# Patient Record
Sex: FEMALE | Race: WHITE | NOT HISPANIC OR LATINO | Employment: STUDENT | URBAN - METROPOLITAN AREA
[De-identification: names, ages, dates, MRNs, and addresses within clinical notes are randomized per-mention and may not be internally consistent; named-entity substitution may affect disease eponyms.]

---
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
## 2025-02-13 ENCOUNTER — TELEPHONE (OUTPATIENT)
Dept: UROLOGY | Age: 20
End: 2025-02-13

## 2025-02-13 NOTE — TELEPHONE ENCOUNTER
Caller: PTS MOM    Relationship:     Best call back number:     What is the best time to reach you: ANY    Who are you requesting to speak with (clinical staff, provider,  specific staff member): ANY CLINICAL     Do you know the name of the person who called:     What was the call regarding: PTS MOM CALLED STATING PT WAS IN [facility] YESTERDAY, SHE IS UNABLE TO PEE ON HER OWN. SHE WAS DISCHARGED WITH A CATHETER AND TOLD BY [facility] THAT SHE NEEDS TO KEEP IT IN FOR 7 DAYS BEFORE BEING SEEN BY A UROLOGIST. PT HAS NOT SEEN A UROLOGIST IN THE PAST, PTS MOM WANTS CLARIFICATION ON IF THE PT SHOULD BE KEEPING THE CATHETER IN FOR 7 DAYS. PLEASE CALL PTS MOM TO LET HER KNOW.    CREATED REFERRAL AND PLACED IN SCHEDULING REVIEW-- ER NOTES FROM [place] ARE IN CARE EVERYWHERE. THANK YOU.

## 2025-02-17 ENCOUNTER — TELEPHONE (OUTPATIENT)
Dept: OBSTETRICS AND GYNECOLOGY | Facility: CLINIC | Age: 20
End: 2025-02-17
Payer: COMMERCIAL

## 2025-02-17 NOTE — TELEPHONE ENCOUNTER
Provider:  NO WEST    Caller: EYAD PEREZ     Phone Number: [phone]     Reason for Call:formerly [facility] IS REQUESTING MEDICAL RECORDS AND SCANS FROM [phone]//PLEASE FAX [phone]

## 2025-02-18 ENCOUNTER — TELEPHONE (OUTPATIENT)
Dept: OBSTETRICS AND GYNECOLOGY | Facility: CLINIC | Age: 20
End: 2025-02-18
Payer: COMMERCIAL

## 2025-02-18 NOTE — TELEPHONE ENCOUNTER
Tried to fax a couple times and cannot get them to go through. Also tried to call Nia at the number listed in the TE. There was no answer or voicemail. Called patient and left a message regarding this request and that she could come by the office and sign a release to request a paper copy of the requested records.

## 2025-02-18 NOTE — TELEPHONE ENCOUNTER
Hub staff attempted to follow warm transfer process and was unsuccessful     Caller: JAMAAL FROM [facility] IN [facility]      Best call back number:[phone]    Patient is needing: EYAD PEREZ IS REQUESTING A COPY OF ANY INFORMATION PERTAINING TO THE PELVIC EXAMS PERFORMED IN 2023 AND 2024 BE FAXED [phone].  NEEDING THESE ASAP PLEASE.

## 2025-04-28 NOTE — PROGRESS NOTES
"CC:  Establish care, WWE, discuss urinary issues      HPI:   19 y.o. . Presents for well woman exam. Contraception:  None  Menses:   q 4-6 weeks, lasts 4 days, changes super tampon q 4-5 hrs on heaviest days.   Pain:  moderate, management with Excedrin, nsaids, PMS nausea- managed with diet  Last pap: Not of age   Complaints: discuss concerns hx urinary problems, In February experienced urinary hesitancy, treated per [facility] for uti, review of ER note- ua negative for nitrites. Was also evaluate at , ua negative for nitrites, treated for bloating and constipation. Had urinary catheter for 1 week due to inability to void. No more urinary problems since shaw cath removed in February. Had Follow up with urology, symptoms had resolved  and told no further workup warranted.     Past Medical History:   Diagnosis Date    Anxiety     Depression       Past Surgical History:   Procedure Laterality Date    EAR TUBES      TONSILLECTOMY        Family History   Problem Relation Age of Onset    Colon cancer Paternal Grandfather         50s    Breast cancer Paternal Grandmother         70    Heart attack Maternal Grandfather     Alzheimer's disease Maternal Great-Grandfather     Uterine cancer Neg Hx     Ovarian cancer Neg Hx      Allergies as of 2025 - Reviewed 2025   Allergen Reaction Noted    Cefdinir GI Intolerance and Other (See Comments) 2019    Wheat Rash 2022        PCP: does manage PMHx and preventative labs    /75   Pulse 72   Ht 165.1 cm (65\")   Wt 81.2 kg (179 lb)   LMP 2025 (Within Days)   BMI 29.79 kg/m²     PHYSICAL EXAM: Chaperone present   General- NAD, alert and oriented, appropriate  Psych- Normal mood, good memory  Neck- No masses, no thyroid enlargement  Lymphatic- No palpable neck nodes  CV- Regular rhythm, no murmurs  Resp- CTA to bases, no wheezes  Ext- No edema, no cyanosis    Skin- No lesions, no rashes, no acanthosis nigricans    ASSESSMENT and " PLAN:    Diagnoses and all orders for this visit:    1. Well woman exam (Primary)    2. Dysmenorrhea      Preventative:   BREAST HEALTH- Monthly self breast exam importance and how to reviewed. MMG and/or MRI (prn) reviewed per society guidelines and her individual history. Screening Imaging: Not medically needed  CERVICAL CANCER Screening- Reviewed current ASCCP guidelines for screening w and wo cotest HPV, age specific.  Screen: Not medically needed  SEXUAL HEALTH: Declines STD screening,  Safe sex and condoms  VACCINATIONS Recommended: COVID and booster PRN, Flu annually, Gardisil/HPV vaccine (up to 44yo)  Importance discussed, risk being unvaccinated reviewed.  Questions answered  Genetic testing: Does not qualify.  Smoking status- NON SMOKER  Follow up PCP/Specialist PMHx and Labs  TRACK MENSES, RTO if <q21d, >7d long, heavy or painful.    All BIRTH CONTROL options R/B/A/SE/E of each reviewed in detail.  CHC/hormone use risk THROMBOEMBOLIC RISK reviewed.     SAFE SEX/condoms importance reviewed.    She is satisfied with current otc/diet management of PMS and painful menstrual cramps. IF her urinary symptoms return, recommend UA with culture, follow up with urology. IC diet/handouts provided. She declines hormone management of painful menses/PMS symptoms, at this time.     Follow Up:  Return if symptoms worsen or fail to improve.        Jennifer Garcia, APRN  05/23/2025

## 2025-05-23 ENCOUNTER — OFFICE VISIT (OUTPATIENT)
Dept: OBSTETRICS AND GYNECOLOGY | Age: 20
End: 2025-05-23
Payer: COMMERCIAL

## 2025-05-23 VITALS
HEIGHT: 65 IN | SYSTOLIC BLOOD PRESSURE: 105 MMHG | DIASTOLIC BLOOD PRESSURE: 75 MMHG | BODY MASS INDEX: 29.82 KG/M2 | WEIGHT: 179 LBS | HEART RATE: 72 BPM

## 2025-05-23 DIAGNOSIS — Z01.419 WELL WOMAN EXAM: Primary | ICD-10-CM

## 2025-05-23 DIAGNOSIS — N94.6 DYSMENORRHEA: ICD-10-CM

## 2025-05-23 RX ORDER — BUPROPION HYDROCHLORIDE 300 MG/1
350 TABLET ORAL EVERY MORNING
COMMUNITY
Start: 2025-04-22

## 2025-05-23 RX ORDER — MULTIPLE VITAMINS W/ MINERALS TAB 9MG-400MCG
1 TAB ORAL DAILY
COMMUNITY